# Patient Record
Sex: FEMALE | Race: ASIAN
[De-identification: names, ages, dates, MRNs, and addresses within clinical notes are randomized per-mention and may not be internally consistent; named-entity substitution may affect disease eponyms.]

---

## 2018-06-10 ENCOUNTER — HOSPITAL ENCOUNTER (EMERGENCY)
Dept: HOSPITAL 61 - ER | Age: 43
Discharge: HOME | End: 2018-06-10
Payer: SELF-PAY

## 2018-06-10 DIAGNOSIS — M54.5: ICD-10-CM

## 2018-06-10 DIAGNOSIS — I10: Primary | ICD-10-CM

## 2018-06-10 LAB
ADD MAN DIFF?: NO
ALBUMIN SERPL-MCNC: 3.5 G/DL (ref 3.4–5)
ALBUMIN/GLOB SERPL: 1 {RATIO} (ref 1–1.7)
ALP SERPL-CCNC: 53 U/L (ref 46–116)
ALT (SGPT): 19 U/L (ref 14–59)
ANION GAP SERPL CALC-SCNC: 8 MMOL/L (ref 6–14)
AST SERPL-CCNC: 15 U/L (ref 15–37)
BACTERIA,URINE: 0 /HPF
BASO #: 0 X10^3/UL (ref 0–0.2)
BASO %: 0 % (ref 0–3)
BILIRUBIN,URINE: NEGATIVE
BLOOD UREA NITROGEN: 12 MG/DL (ref 7–20)
BUN/CREAT SERPL: 15 (ref 6–20)
CALCIUM: 8.3 MG/DL (ref 8.5–10.1)
CHLORIDE: 104 MMOL/L (ref 98–107)
CLARITY,URINE: CLEAR
CO2 SERPL-SCNC: 27 MMOL/L (ref 21–32)
COLOR,URINE: YELLOW
CREAT SERPL-MCNC: 0.8 MG/DL (ref 0.6–1)
EOS #: 0.1 X10^3/UL (ref 0–0.7)
EOS %: 1 % (ref 0–3)
GFR SERPLBLD BASED ON 1.73 SQ M-ARVRAT: 79 ML/MIN
GLOBULIN SER-MCNC: 3.6 G/DL (ref 2.2–3.8)
GLUCOSE SERPL-MCNC: 122 MG/DL (ref 70–99)
GLUCOSE,URINE: NEGATIVE MG/DL
HCG SERPL-ACNC: 4.7 X10^3/UL (ref 4–11)
HEMATOCRIT: 36.8 % (ref 36–47)
HEMOGLOBIN: 12.3 G/DL (ref 12–15.5)
LYMPH #: 1.2 X10^3/UL (ref 1–4.8)
LYMPH %: 26 % (ref 24–48)
MEAN CORPUSCULAR HEMOGLOBIN: 27 PG (ref 25–35)
MEAN CORPUSCULAR HGB CONC: 33 G/DL (ref 31–37)
MEAN CORPUSCULAR VOLUME: 81 FL (ref 79–100)
MONO #: 0.3 X10^3/UL (ref 0–1.1)
MONO %: 6 % (ref 0–9)
NEG OBC UR: (no result)
NEUT #: 3.2 X10^3UL (ref 1.8–7.7)
NEUT %: 67 % (ref 31–73)
NITRITE,URINE: NEGATIVE
PH,URINE: 6
PLATELET COUNT: 221 X10^3/UL (ref 140–400)
POC GLUCOSE: 151 MG/DL (ref 70–99)
POS OBC UR: (no result)
POTASSIUM SERPL-SCNC: 3.5 MMOL/L (ref 3.5–5.1)
PROTEIN,URINE: NEGATIVE MG/DL
RBC,URINE: 0 /HPF (ref 0–2)
RED BLOOD COUNT: 4.52 X10^6/UL (ref 3.5–5.4)
RED CELL DISTRIBUTION WIDTH: 13.3 % (ref 11.5–14.5)
SODIUM: 139 MMOL/L (ref 136–145)
SPECIFIC GRAVITY,URINE: 1.01
SQUAMOUS EPITHELIAL CELL,UR: (no result) /LPF
TOTAL BILIRUBIN: 0.8 MG/DL (ref 0.2–1)
TOTAL PROTEIN: 7.1 G/DL (ref 6.4–8.2)
TROPONINI: < 0.017 NG/ML (ref 0–0.06)
U PREG PATIENT: NEGATIVE
UROBILINOGEN,URINE: 1 MG/DL
WBC,URINE: 0 /HPF (ref 0–4)

## 2018-06-10 PROCEDURE — 36415 COLL VENOUS BLD VENIPUNCTURE: CPT

## 2018-06-10 PROCEDURE — 81001 URINALYSIS AUTO W/SCOPE: CPT

## 2018-06-10 PROCEDURE — 81025 URINE PREGNANCY TEST: CPT

## 2018-06-10 PROCEDURE — 85025 COMPLETE CBC W/AUTO DIFF WBC: CPT

## 2018-06-10 PROCEDURE — 93005 ELECTROCARDIOGRAM TRACING: CPT

## 2018-06-10 PROCEDURE — 82962 GLUCOSE BLOOD TEST: CPT

## 2018-06-10 PROCEDURE — 80053 COMPREHEN METABOLIC PANEL: CPT

## 2018-06-10 PROCEDURE — 84484 ASSAY OF TROPONIN QUANT: CPT

## 2018-06-10 PROCEDURE — 99285 EMERGENCY DEPT VISIT HI MDM: CPT

## 2018-06-10 PROCEDURE — 96360 HYDRATION IV INFUSION INIT: CPT

## 2018-06-10 RX ADMIN — BACITRACIN 1 MLS/HR: 5000 INJECTION, POWDER, FOR SOLUTION INTRAMUSCULAR at 14:21

## 2018-08-25 ENCOUNTER — HOSPITAL ENCOUNTER (EMERGENCY)
Dept: HOSPITAL 61 - ER | Age: 43
Discharge: HOME | End: 2018-08-25
Payer: SELF-PAY

## 2018-08-25 VITALS — HEIGHT: 60 IN | BODY MASS INDEX: 31.41 KG/M2 | WEIGHT: 160 LBS

## 2018-08-25 VITALS — SYSTOLIC BLOOD PRESSURE: 129 MMHG | DIASTOLIC BLOOD PRESSURE: 83 MMHG

## 2018-08-25 DIAGNOSIS — Z98.890: ICD-10-CM

## 2018-08-25 DIAGNOSIS — I10: ICD-10-CM

## 2018-08-25 DIAGNOSIS — L02.415: Primary | ICD-10-CM

## 2018-08-25 PROCEDURE — 10060 I&D ABSCESS SIMPLE/SINGLE: CPT

## 2018-08-25 PROCEDURE — 90471 IMMUNIZATION ADMIN: CPT

## 2018-08-25 PROCEDURE — 99284 EMERGENCY DEPT VISIT MOD MDM: CPT

## 2018-08-25 PROCEDURE — 90715 TDAP VACCINE 7 YRS/> IM: CPT

## 2018-08-25 PROCEDURE — 87071 CULTURE AEROBIC QUANT OTHER: CPT

## 2018-08-25 PROCEDURE — 87075 CULTR BACTERIA EXCEPT BLOOD: CPT

## 2018-08-25 NOTE — PHYS DOC
Past Medical History


Past Medical History:  No Pertinent History, Hypertension


Past Surgical History:  


Alcohol Use:  None


Drug Use:  None





Adult General


Chief Complaint


Chief Complaint:  ABSCESS





HPI


HPI





Patient is a 43  year old female who presents to the emergency room with 

complaints of a red tender area to her right posterior thigh near her buttock 

for the last 6 days. Patient denies any fever. SHe states that the area started 

draining bloody pus today. Currently she rates her pain as 8 out of 10 on the 

pain scale, states that she is taking Tylenol for relief of the pain without 

much benefit.





Review of Systems


Review of Systems





Constitutional: Denies fever or chills []


Musculoskeletal: Denies back pain or joint pain reports tenderness to posterior 

right thigh near buttock []


Integument: Reports red tender area that is draining bloody pus of the right 

posterior thigh near her buttock.


Neurologic: Denies focal weakness or sensory changes []





Current Medications


Current Medications





Current Medications








 Medications


  (Trade)  Dose


 Ordered  Sig/Sakina  Start Time


 Stop Time Status Last Admin


Dose Admin


 


 Diphtheria/


 Tetanus/Acell


 Pertussis


  (Boostrix)  0.5 ml  ONCE ONCE  18 17:30


 18 17:31 DC 18 17:37


0.5 ML


 


 Lidocaine/Sodium


 Bicarbonate


  (Buffered


 Lidocaine 1%)  3 ml  1X  ONCE  18 16:30


 18 16:31 DC 18 16:30


3 ML











Allergies


Allergies





Allergies








Coded Allergies Type Severity Reaction Last Updated Verified


 


  No Known Drug Allergies    6/10/18 No











Physical Exam


Physical Exam





Constitutional: Well developed, well nourished, no acute distress, non-toxic 

appearance. []


HENT: Normocephalic, atraumatic, bilateral external ears normal, nose normal. []


Eyes: PERRLA,conjunctiva normal, no discharge. [] 


Skin: Warm, dry, and large 10 cm x 7 cm area of redness to right medial 

posterior thigh nearby LOC consistent with an abscess, with centralized open 

area that is draining bloody pus


Extremities: No tenderness, no cyanosis,  no edema. [] 


Neurologic: Alert and oriented X 3, normal motor function, normal sensory 

function, no focal deficits noted. []


Psychologic: Affect normal, judgement normal, mood normal. []





Current Patient Data


Vital Signs





 Vital Signs








  Date Time  Temp Pulse Resp B/P (MAP) Pulse Ox O2 Delivery O2 Flow Rate FiO2


 


18 16:18 97.6 77 18 129/83 (98) 97 Room Air  





 97.6       











EKG


EKG


[]





Radiology/Procedures


Radiology/Procedures


Indication: abscess





Procedure: The patient was positioned appropriately. Local anesthesia was 1% 

buffered lidocaine.  A large amount of dark red blood and pus material was 

expressed. The drainage cavity was irrigated with 50 ml of NS and packed with 

iodoform gauze. The patients tetanus status updated as needed. 





The patient tolerated the procedure well.





Complications: none.[]





Course & Med Decision Making


Course & Med Decision Making


Pertinent Labs and Imaging studies reviewed. (See chart for details)





[]





Dragon Disclaimer


Dragon Disclaimer


This electronic medical record was generated, in whole or in part, using a 

voice recognition dictation system.





Departure


Departure


Impression:  


 Primary Impression:  


 Abscess of right thigh


Disposition:   HOME, SELF-CARE


Condition:  STABLE


Referrals:  


NO PCP (PCP)


Patient Instructions:  Abscess, Care After





Additional Instructions:  


Fill the prescription(s) and use as directed. You may take tylenol or ibuprofen 

as needed for pain. Leave the Dressing that was placed in the ER in place for 

the next 24 hours, then change the dressing twice daily and as needed. You may 

apply warm, moist packs to the area to help decrease discomfort. Follow up with 

your primary care doctor or return to the ER in 48 hours to have wound 

rechecked. Return to the ER sooner if your symptoms worsen.


Scripts


Sulfamethoxazole/Trimethoprim (BACTRIM 400-80 MG TABLET) 1 Each Tablet


1 TAB PO BID, #20 TAB 0 Refills


   Prov: JAY SUAREZ         18











JAY SUAREZ Aug 25, 2018 16:34

## 2019-08-10 ENCOUNTER — HOSPITAL ENCOUNTER (EMERGENCY)
Dept: HOSPITAL 61 - ER | Age: 44
LOS: 1 days | Discharge: HOME | End: 2019-08-11
Payer: SELF-PAY

## 2019-08-10 VITALS — BODY MASS INDEX: 31.41 KG/M2 | HEIGHT: 60 IN | WEIGHT: 160 LBS

## 2019-08-10 DIAGNOSIS — E11.9: ICD-10-CM

## 2019-08-10 DIAGNOSIS — R07.89: Primary | ICD-10-CM

## 2019-08-10 DIAGNOSIS — Z98.890: ICD-10-CM

## 2019-08-10 DIAGNOSIS — R51: ICD-10-CM

## 2019-08-10 DIAGNOSIS — I10: ICD-10-CM

## 2019-08-10 DIAGNOSIS — Z79.82: ICD-10-CM

## 2019-08-10 LAB
ALBUMIN SERPL-MCNC: 3.6 G/DL (ref 3.4–5)
ALBUMIN/GLOB SERPL: 1 {RATIO} (ref 1–1.7)
ALP SERPL-CCNC: 52 U/L (ref 46–116)
ALT SERPL-CCNC: 15 U/L (ref 14–59)
ANION GAP SERPL CALC-SCNC: 8 MMOL/L (ref 6–14)
APTT PPP: YELLOW S
AST SERPL-CCNC: 14 U/L (ref 15–37)
BACTERIA #/AREA URNS HPF: (no result) /HPF
BASOPHILS # BLD AUTO: 0 X10^3/UL (ref 0–0.2)
BASOPHILS NFR BLD: 0 % (ref 0–3)
BILIRUB SERPL-MCNC: 0.7 MG/DL (ref 0.2–1)
BILIRUB UR QL STRIP: NEGATIVE
BUN SERPL-MCNC: 11 MG/DL (ref 7–20)
BUN/CREAT SERPL: 14 (ref 6–20)
CALCIUM SERPL-MCNC: 8.6 MG/DL (ref 8.5–10.1)
CHLORIDE SERPL-SCNC: 103 MMOL/L (ref 98–107)
CK SERPL-CCNC: 67 U/L (ref 26–192)
CO2 SERPL-SCNC: 30 MMOL/L (ref 21–32)
CREAT SERPL-MCNC: 0.8 MG/DL (ref 0.6–1)
EOSINOPHIL NFR BLD: 0.1 X10^3/UL (ref 0–0.7)
EOSINOPHIL NFR BLD: 1 % (ref 0–3)
ERYTHROCYTE [DISTWIDTH] IN BLOOD BY AUTOMATED COUNT: 14 % (ref 11.5–14.5)
FIBRINOGEN PPP-MCNC: CLEAR MG/DL
GFR SERPLBLD BASED ON 1.73 SQ M-ARVRAT: 78.7 ML/MIN
GLOBULIN SER-MCNC: 3.7 G/DL (ref 2.2–3.8)
GLUCOSE SERPL-MCNC: 115 MG/DL (ref 70–99)
HCT VFR BLD CALC: 35.4 % (ref 36–47)
HGB BLD-MCNC: 11.8 G/DL (ref 12–15.5)
LIPASE: 151 U/L (ref 73–393)
LYMPHOCYTES # BLD: 1.3 X10^3/UL (ref 1–4.8)
LYMPHOCYTES NFR BLD AUTO: 28 % (ref 24–48)
MAGNESIUM SERPL-MCNC: 2 MG/DL (ref 1.8–2.4)
MCH RBC QN AUTO: 27 PG (ref 25–35)
MCHC RBC AUTO-ENTMCNC: 33 G/DL (ref 31–37)
MCV RBC AUTO: 81 FL (ref 79–100)
MONO #: 0.4 X10^3/UL (ref 0–1.1)
MONOCYTES NFR BLD: 8 % (ref 0–9)
NEUT #: 2.9 X10^3/UL (ref 1.8–7.7)
NEUTROPHILS NFR BLD AUTO: 62 % (ref 31–73)
NITRITE UR QL STRIP: NEGATIVE
PH UR STRIP: 6 [PH]
PLATELET # BLD AUTO: 190 X10^3/UL (ref 140–400)
POTASSIUM SERPL-SCNC: 3.2 MMOL/L (ref 3.5–5.1)
PROT SERPL-MCNC: 7.3 G/DL (ref 6.4–8.2)
PROT UR STRIP-MCNC: NEGATIVE MG/DL
RBC # BLD AUTO: 4.4 X10^6/UL (ref 3.5–5.4)
RBC #/AREA URNS HPF: (no result) /HPF (ref 0–2)
SODIUM SERPL-SCNC: 141 MMOL/L (ref 136–145)
SQUAMOUS #/AREA URNS LPF: (no result) /LPF
UROBILINOGEN UR-MCNC: 1 MG/DL
WBC # BLD AUTO: 4.7 X10^3/UL (ref 4–11)
WBC #/AREA URNS HPF: (no result) /HPF (ref 0–4)
YEAST #/AREA URNS HPF: PRESENT /HPF

## 2019-08-10 PROCEDURE — 82553 CREATINE MB FRACTION: CPT

## 2019-08-10 PROCEDURE — 83880 ASSAY OF NATRIURETIC PEPTIDE: CPT

## 2019-08-10 PROCEDURE — 87086 URINE CULTURE/COLONY COUNT: CPT

## 2019-08-10 PROCEDURE — 85025 COMPLETE CBC W/AUTO DIFF WBC: CPT

## 2019-08-10 PROCEDURE — 80053 COMPREHEN METABOLIC PANEL: CPT

## 2019-08-10 PROCEDURE — 96375 TX/PRO/DX INJ NEW DRUG ADDON: CPT

## 2019-08-10 PROCEDURE — 71046 X-RAY EXAM CHEST 2 VIEWS: CPT

## 2019-08-10 PROCEDURE — 83735 ASSAY OF MAGNESIUM: CPT

## 2019-08-10 PROCEDURE — 99285 EMERGENCY DEPT VISIT HI MDM: CPT

## 2019-08-10 PROCEDURE — 93005 ELECTROCARDIOGRAM TRACING: CPT

## 2019-08-10 PROCEDURE — 36415 COLL VENOUS BLD VENIPUNCTURE: CPT

## 2019-08-10 PROCEDURE — 84484 ASSAY OF TROPONIN QUANT: CPT

## 2019-08-10 PROCEDURE — 96374 THER/PROPH/DIAG INJ IV PUSH: CPT

## 2019-08-10 PROCEDURE — 81001 URINALYSIS AUTO W/SCOPE: CPT

## 2019-08-10 PROCEDURE — 83690 ASSAY OF LIPASE: CPT

## 2019-08-11 VITALS — DIASTOLIC BLOOD PRESSURE: 61 MMHG | SYSTOLIC BLOOD PRESSURE: 123 MMHG

## 2019-08-11 NOTE — PHYS DOC
Past Medical History


Past Medical History:  Diabetes-Type II, Hypertension


Past Surgical History:  


Alcohol Use:  None


Drug Use:  None





Adult General


Chief Complaint


Chief Complaint:  CHEST PAIN





HPI


HPI





Patient is a 44  year old [f__sex] who presents with []





Review of Systems


Review of Systems





Constitutional: Denies fever or chills []


Eyes: Denies change in visual acuity, redness, or eye pain []


HENT: Denies nasal congestion or sore throat []


Respiratory: Denies cough or shortness of breath []


Cardiovascular: No additional information not addressed in HPI []


GI: Denies abdominal pain, nausea, vomiting, bloody stools or diarrhea []


: Denies dysuria or hematuria []


Musculoskeletal: Denies back pain or joint pain []


Integument: Denies rash or skin lesions []


Neurologic: Denies headache, focal weakness or sensory changes []


Endocrine: Denies polyuria or polydipsia []





All other systems were reviewed and found to be within normal limits, except as 

documented in this note.





Current Medications


Current Medications





Current Medications








 Medications


  (Trade)  Dose


 Ordered  Sig/Sakina  Start Time


 Stop Time Status Last Admin


Dose Admin


 


 Aspirin


  (Sylvain Aspirin)  325 mg  1X  ONCE  8/10/19 23:00


 8/10/19 23:04 DC 8/10/19 23:41


325 MG


 


 Cephalexin HCl


  (Keflex)  500 mg  1X  ONCE  19 00:00


 19 00:01 DC 19 00:08


500 MG


 


 Dexamethasone


 Sodium Phosphate


  (Decadron)  10 mg  1X  ONCE  19 01:00


 19 01:01 DC 19 01:11


10 MG


 


 Diphenhydramine


 HCl


  (Benadryl)  25 mg  1X  ONCE  19 01:00


 19 01:01 DC 19 01:11


25 MG


 


 Fentanyl Citrate


  (Fentanyl 2ml


 Vial)  50 mcg  1X  ONCE  8/10/19 23:15


 8/10/19 23:16 DC 8/10/19 23:41


50 MCG


 


 Fluconazole


  (Diflucan)  200 mg  1X  ONCE  19 00:00


 19 00:01 DC 19 00:08


200 MG


 


 Ketorolac


 Tromethamine


  (Toradol 15mg


 Vial)  15 mg  1X  ONCE  19 01:00


 19 01:01 DC 19 01:11


15 MG


 


 Metoclopramide HCl


  (Reglan Vial)  10 mg  1X  ONCE  19 01:00


 19 01:01 DC 19 01:11


10 MG


 


 Sodium Chloride  1,000 ml @ 


 1,000 mls/hr  1X  ONCE  8/10/19 23:00


 8/10/19 23:59 DC 8/10/19 23:41


1,000 MLS/HR











Allergies


Allergies





Allergies








Coded Allergies Type Severity Reaction Last Updated Verified


 


  No Known Drug Allergies    6/10/18 No











Physical Exam


Physical Exam





Constitutional: Well developed, well nourished, no acute distress, non-toxic 

appearance. []


HENT: Normocephalic, atraumatic, bilateral external ears normal, oropharynx 

moist, no oral exudates, nose normal. []


Eyes: PERRLA, EOMI, conjunctiva normal, no discharge. [] 


Neck: Normal range of motion, no tenderness, supple, no stridor. [] 


Cardiovascular:Heart rate regular rhythm, no murmur []


Lungs & Thorax:  Bilateral breath sounds clear to auscultation []


Abdomen: Bowel sounds normal, soft, no tenderness, no masses, no pulsatile 

masses. [] 


Skin: Warm, dry, no erythema, no rash. [] 


Back: No tenderness, no CVA tenderness. [] 


Extremities: No tenderness, no cyanosis, no clubbing, ROM intact, no edema. [] 


Neurologic: Alert and oriented X 3, normal motor function, normal sensory 

function, no focal deficits noted. []


Psychologic: Affect normal, judgement normal, mood normal. []





Current Patient Data


Vital Signs





                                   Vital Signs








  Date Time  Temp Pulse Resp B/P (MAP) Pulse Ox O2 Delivery O2 Flow Rate FiO2


 


19 01:02  74 16 136/68 (90) 99 Room Air  


 


8/10/19 22:48 98.3       





 98.3       








Lab Values





                                Laboratory Tests








Test


 8/10/19


22:00 8/10/19


23:00 19


01:15


 


White Blood Count


 4.7 x10^3/uL


(4.0-11.0) 


 





 


Red Blood Count


 4.40 x10^6/uL


(3.50-5.40) 


 





 


Hemoglobin


 11.8 g/dL


(12.0-15.5)  L 


 





 


Hematocrit


 35.4 %


(36.0-47.0)  L 


 





 


Mean Corpuscular Volume


 81 fL ()


 


 





 


Mean Corpuscular Hemoglobin 27 pg (25-35)    


 


Mean Corpuscular Hemoglobin


Concent 33 g/dL


(31-37) 


 





 


Red Cell Distribution Width


 14.0 %


(11.5-14.5) 


 





 


Platelet Count


 190 x10^3/uL


(140-400) 


 





 


Neutrophils (%) (Auto) 62 % (31-73)    


 


Lymphocytes (%) (Auto) 28 % (24-48)    


 


Monocytes (%) (Auto) 8 % (0-9)    


 


Eosinophils (%) (Auto) 1 % (0-3)    


 


Basophils (%) (Auto) 0 % (0-3)    


 


Neutrophils # (Auto)


 2.9 x10^3/uL


(1.8-7.7) 


 





 


Lymphocytes # (Auto)


 1.3 x10^3/uL


(1.0-4.8) 


 





 


Monocytes # (Auto)


 0.4 x10^3/uL


(0.0-1.1) 


 





 


Eosinophils # (Auto)


 0.1 x10^3/uL


(0.0-0.7) 


 





 


Basophils # (Auto)


 0.0 x10^3/uL


(0.0-0.2) 


 





 


Sodium Level


 141 mmol/L


(136-145) 


 





 


Potassium Level


 3.2 mmol/L


(3.5-5.1)  L 


 





 


Chloride Level


 103 mmol/L


() 


 





 


Carbon Dioxide Level


 30 mmol/L


(21-32) 


 





 


Anion Gap 8 (6-14)    


 


Blood Urea Nitrogen


 11 mg/dL


(7-20) 


 





 


Creatinine


 0.8 mg/dL


(0.6-1.0) 


 





 


Estimated GFR


(Cockcroft-Gault) 78.7  


 


 





 


BUN/Creatinine Ratio 14 (6-20)    


 


Glucose Level


 115 mg/dL


(70-99)  H 


 





 


Calcium Level


 8.6 mg/dL


(8.5-10.1) 


 





 


Magnesium Level


 2.0 mg/dL


(1.8-2.4) 


 





 


Total Bilirubin


 0.7 mg/dL


(0.2-1.0) 


 





 


Aspartate Amino Transferase


(AST) 14 U/L (15-37)


L 


 





 


Alanine Aminotransferase (ALT)


 15 U/L (14-59)


 


 





 


Alkaline Phosphatase


 52 U/L


() 


 





 


Creatine Kinase


 67 U/L


() 


 





 


Creatine Kinase MB (Mass)


 < 0.5 ng/mL


(0.0-3.6) 


 





 


Creatine Kinase MB Relative


Index  % (0-4)  


 


 





 


Troponin I Quantitative


 < 0.017 ng/mL


(0.000-0.055) 


 < 0.017 ng/mL


(0.000-0.055)


 


NT-Pro-B-Type Natriuretic


Peptide 41 pg/mL


(0-124) 


 





 


Total Protein


 7.3 g/dL


(6.4-8.2) 


 





 


Albumin


 3.6 g/dL


(3.4-5.0) 


 





 


Albumin/Globulin Ratio 1.0 (1.0-1.7)    


 


Lipase


 151 U/L


() 


 





 


Urine Collection Type  Unknown   


 


Urine Color  Yellow   


 


Urine Clarity  Clear   


 


Urine pH  6.0   


 


Urine Specific Gravity  1.010   


 


Urine Protein


 


 Negative mg/dL


(NEG-TRACE) 





 


Urine Glucose (UA)


 


 Negative mg/dL


(NEG) 





 


Urine Ketones (Stick)


 


 Negative mg/dL


(NEG) 





 


Urine Blood


 


 Negative (NEG)


 





 


Urine Nitrite


 


 Negative (NEG)


 





 


Urine Bilirubin


 


 Negative (NEG)


 





 


Urine Urobilinogen Dipstick


 


 1.0 mg/dL (0.2


mg/dL) 





 


Urine Leukocyte Esterase


 


 Moderate (NEG)


 





 


Urine RBC


 


 1-2 /HPF (0-2)


 





 


Urine WBC


 


 20-40 /HPF


(0-4) 





 


Urine Squamous Epithelial


Cells 


 Many /LPF  


 





 


Urine Bacteria


 


 Few /HPF


(0-FEW) 





 


Urine Mucus  Slight /LPF   


 


Urine Yeast  Present /HPF   





                                Laboratory Tests


8/10/19 22:00








                                Laboratory Tests


8/10/19 22:00














EKG


EKG


@2253 NSR at 72bpm, NO ST elevation, t wave inversion III





Radiology/Procedures


Radiology/Procedures


CXR 2 view (preliminary interpretation by ED physician):  No acute process noted





Course & Med Decision Making


Course & Med Decision Making


Pertinent Labs and Imaging studies reviewed. (See chart for details)





[]





Dragon Disclaimer


Dragon Disclaimer


This electronic medical record was generated, in whole or in part, using a voice

 recognition dictation system.





Departure


Departure


Impression:  


   Primary Impression:  


   Chest pain


   Additional Impression:  


   Headache


Disposition:   HOME, SELF-CARE


Condition:  STABLE


Referrals:  


NO PCP (PCP)


Patient Instructions:  Chest Pain (Nonspecific), Easy-to-Read, Headache, FAQs


Scripts


Ondansetron (ONDANSETRON ODT) 4 Mg Tab.rapdis


1 TAB PO PRN Q6-8HRS PRN for NAUSEA, #16 TAB


   Prov: ELENA RODNEY          19 


Butalb/Acetaminophen/Caffeine ([id number]-[id number] -40) 1 Each Tablet


1 EACH PO Q6HRS PRN for HEADACHE, #14 TAB


   Prov: ELENA RODNEY          19





The HEART Score for CP Pts


Risk Factors:


Risk Factors:  DM, Current or recent (<one month) smoker, HTN, HLP, family 

history of CAD, obesity.


Risk Scores:


Score 0 - 3:  2.5% MACE over next 6 weeks - Discharge Home


Score 4 - 6:  20.3% MACE over next 6 weeks - Admit for Clinical Observation


Score 7 - 10:  72.7% MACE over next 6 weeks - Early Invasive Strategies





Problem Qualifiers








   Primary Impression:  


   Chest pain


   Chest pain type:  unspecified  Qualified Codes:  R07.9 - Chest pain, 

   unspecified


   Additional Impression:  


   Headache


   Headache type:  unspecified  Headache chronicity pattern:  acute headache  

   Intractability:  not intractable  Qualified Codes:  R51 - Headache








ELENA RODNEY              Aug 11, 2019 02:07

## 2019-08-11 NOTE — RAD
CHEST PA   LATERAL

 

History: Chest pain. 

 

Comparison: None.

 

Findings: 

The cardiomediastinal silhouette is normal. Calcified subcarinal lymph 

node. Pulmonary vasculature is normal. The lungs are clear. No pleural 

effusion or pneumothorax is seen. There is no acute bone abnormality. 

 

IMPRESSION: 

No acute cardiopulmonary process. 

 

 

Electronically signed by: Asher Wilson MD (8/11/2019 10:12 AM) [facility]

## 2019-08-11 NOTE — EKG
[facility]

              [address] [place] [address]

Test Date:    2019-08-10               Test Time:    22:53:26

Pat Name:     CAESAR ZARAGOZA              Department:   

Patient ID:   [id number]           Room:          

Gender:       F                        Technician:   

:          [id number]               Requested By: ELENA RODNEY

Order Number: [id number]           Reading MD:     

                                 Measurements

Intervals                              Axis          

Rate:         72                       P:            28

OH:           186                      QRS:          4

QRSD:         84                       T:            4

QT:           376                                    

QTc:          413                                    

                           Interpretive Statements

SINUS RHYTHM

NO SPECIFIC ECG ABNORMALITIES

RI6.01

No previous ECG available for comparison